# Patient Record
Sex: FEMALE | Race: WHITE | NOT HISPANIC OR LATINO | Employment: OTHER | URBAN - METROPOLITAN AREA
[De-identification: names, ages, dates, MRNs, and addresses within clinical notes are randomized per-mention and may not be internally consistent; named-entity substitution may affect disease eponyms.]

---

## 2024-03-11 ENCOUNTER — TELEPHONE (OUTPATIENT)
Dept: GASTROENTEROLOGY | Facility: CLINIC | Age: 79
End: 2024-03-11
Payer: MEDICARE

## 2024-03-12 NOTE — TELEPHONE ENCOUNTER
The patient had 3 TA. She is due for repeat colonoscopy 9/20/2024. Okay to make next available OV with NP to update chart and have repeat colonoscopy scheduled. Unclear if I had listed 5 years in error but 3 years is the correct repeat colonoscopy timeframe after that colonoscopy.  NBP

## 2024-03-20 RX ORDER — LOVASTATIN 20 MG/1
TABLET ORAL
COMMUNITY
Start: 2024-03-06

## 2024-03-20 RX ORDER — AMLODIPINE BESYLATE 5 MG/1
TABLET ORAL
COMMUNITY
Start: 2024-02-26

## 2024-03-20 RX ORDER — LEVOTHYROXINE SODIUM 100 UG/1
TABLET ORAL
COMMUNITY
Start: 2024-03-04

## 2024-03-20 RX ORDER — CARVEDILOL 12.5 MG/1
TABLET ORAL
COMMUNITY
Start: 2023-12-29

## 2024-03-20 RX ORDER — LOSARTAN POTASSIUM AND HYDROCHLOROTHIAZIDE 12.5; 1 MG/1; MG/1
1 TABLET ORAL
COMMUNITY
Start: 2024-02-28

## 2024-03-20 NOTE — PROGRESS NOTES
Clinic Note    Reason for visit:  The primary encounter diagnosis was Irritable bowel syndrome with diarrhea. Diagnoses of History of colon polyps, Diverticulosis of colon, and Screening for colon cancer were also pertinent to this visit.    PCP: Ananth Santiago       HPI:  This is a 78 y.o. female who was last seen by Dr. Nixon in 9/2021. She has h/o colon polyps and is due for repeat colonoscopy in 9/2021. Denies constipation, abdominal pain, or blood in stool. Reports intermittent diarrhea depending on diet. May have diarrhea a couple times per week. Not more than once daily. She does use artificial sweeteners daily. She has a lot of gas as well. She also reports intermittent urgency. May have BM every 1-3 days. She states urgency seems to occur after eating sugar but not always. Does not notice if urgency happens after days of no BM. Denies reflux or trouble swallowing.   No blood thinner. No h/o stroke.     Colonoscopy 9/20/2021: Moderate diverticulosis of the sigmoid colon. CBx nl, 3 TA, repeat colon in 3y.    EGD/Colonoscopy with Dr. Torres 5/21/2014: Sliding medium HH with Aguilar ulcers. Moderate diverticulosis of the the left side of the colon. GBx erosive gastritis    2010 Celiac neg, 2010 EGD Aguilar ulcers, lg HH, ring. 2008 colon nl. Father EC.    Review of Systems   Constitutional:  Negative for fatigue, fever and unexpected weight change.   HENT:  Negative for mouth sores, postnasal drip, sore throat and trouble swallowing.    Eyes:  Negative for pain, discharge and eye dryness.   Respiratory:  Positive for apnea. Negative for cough, choking, chest tightness, shortness of breath and wheezing.    Cardiovascular:  Negative for chest pain, palpitations and leg swelling.   Gastrointestinal:  Negative for abdominal distention, abdominal pain, anal bleeding, blood in stool, change in bowel habit, constipation, diarrhea, nausea, rectal pain, vomiting, reflux and fecal incontinence.   Genitourinary:   "Positive for bladder incontinence. Negative for dysuria and hematuria.   Musculoskeletal:  Negative for arthralgias, back pain and joint swelling.   Integumentary:  Negative for color change and rash.   Allergic/Immunologic: Negative for environmental allergies and food allergies.   Neurological:  Negative for seizures and headaches.   Hematological:  Negative for adenopathy. Does not bruise/bleed easily.        Past Medical History:   Diagnosis Date    Atrial fibrillation     BMI 40.0-44.9, adult     Disorder of thyroid, unspecified     Diverticulosis     Dyslipidemia     High cholesterol     Hypertension     Liposarcoma     ANN-MARIE (obstructive sleep apnea)     Personal history of colonic polyps      Past Surgical History:   Procedure Laterality Date    CHOLECYSTECTOMY      COLONOSCOPY  09/20/2021    EGD - EXTERNAL RESULT  05/21/2014    EYE SURGERY Left 2023 October     Family History   Problem Relation Age of Onset    Hepatitis Mother     Cancer Father     Colon cancer Neg Hx     Rectal cancer Neg Hx     Liver disease Neg Hx     Stomach cancer Neg Hx     Pancreatic cancer Neg Hx     Crohn's disease Neg Hx     Ulcerative colitis Neg Hx      Social History     Tobacco Use    Smoking status: Never    Smokeless tobacco: Never   Substance Use Topics    Alcohol use: Never    Drug use: Never     Review of patient's allergies indicates:  No Known Allergies   Medication List with Changes/Refills   Current Medications    AMLODIPINE (NORVASC) 5 MG TABLET        CARVEDILOL (COREG) 12.5 MG TABLET        LOSARTAN-HYDROCHLOROTHIAZIDE 100-12.5 MG (HYZAAR) 100-12.5 MG TAB    Take 1 tablet by mouth.    LOVASTATIN (MEVACOR) 20 MG TABLET        SYNTHROID 100 MCG TABLET        VITAMIN D (VITAMIN D3) 1000 UNITS TAB    Take 1,000 Units by mouth once daily.         Vital Signs:  BP (!) 178/89 (BP Location: Left arm, Patient Position: Sitting)   Pulse 65   Ht 5' 6" (1.676 m)   Wt 117 kg (258 lb)   SpO2 96%   BMI 41.64 kg/m²  "       Physical Exam  Vitals reviewed.   Constitutional:       General: She is awake. She is not in acute distress.     Appearance: Normal appearance. She is well-developed. She is obese. She is not ill-appearing, toxic-appearing or diaphoretic.   HENT:      Head: Normocephalic and atraumatic.      Nose: Nose normal.      Mouth/Throat:      Mouth: Mucous membranes are moist.      Pharynx: Oropharynx is clear. No oropharyngeal exudate or posterior oropharyngeal erythema.   Eyes:      General: Lids are normal. Gaze aligned appropriately. No scleral icterus.        Right eye: No discharge.         Left eye: No discharge.      Conjunctiva/sclera: Conjunctivae normal.   Neck:      Trachea: Trachea normal.   Cardiovascular:      Rate and Rhythm: Normal rate and regular rhythm.      Pulses:           Radial pulses are 2+ on the right side and 2+ on the left side.   Pulmonary:      Effort: Pulmonary effort is normal. No respiratory distress.      Breath sounds: No stridor. No wheezing.   Chest:      Chest wall: No tenderness.   Abdominal:      General: Bowel sounds are normal. There is no distension.      Palpations: Abdomen is soft. There is no fluid wave, hepatomegaly or mass.      Tenderness: There is no abdominal tenderness. There is no guarding or rebound.   Musculoskeletal:         General: No tenderness or deformity.      Cervical back: Full passive range of motion without pain and neck supple. No tenderness.      Right lower leg: No edema.      Left lower leg: No edema.   Lymphadenopathy:      Cervical: No cervical adenopathy.   Skin:     General: Skin is warm and dry.      Capillary Refill: Capillary refill takes less than 2 seconds.      Coloration: Skin is not cyanotic, jaundiced or pale.   Neurological:      General: No focal deficit present.      Mental Status: She is alert and oriented to person, place, and time.      Motor: No tremor.   Psychiatric:         Attention and Perception: Attention normal.          Mood and Affect: Mood and affect normal.         Speech: Speech normal.         Behavior: Behavior normal. Behavior is cooperative.            All of the data above and below has been reviewed by myself and any further interpretations will be reflected in the assessment and plan.   The data includes review of external notes, and independent interpretation of lab results, procedures, x-rays, and imaging reports.      Assessment:  Irritable bowel syndrome with diarrhea    History of colon polyps  -     Ambulatory Referral to External Surgery    Diverticulosis of colon    Screening for colon cancer  -     Ambulatory Referral to External Surgery    Colon due 9/2024.  Trial of Xifaxan for IBS-D. Will give samples. 2021 CBx nl.      Recommendations:  Schedule colonoscopy with Dr. Nixon after 9/20/2024.   Begin taking Xifaxan 550 mg three times daily for 5 days.     Risks, benefits, and alternatives of medical management, any associated procedures, and/or treatment discussed with the patient. Patient given opportunity to ask questions and voices understanding. Patient has elected to proceed with the recommended care modalities as discussed.    Follow up if symptoms worsen or fail to improve.    Order summary:  Orders Placed This Encounter   Procedures    Ambulatory Referral to External Surgery        Instructed patient to notify my office if they have not been contacted within two weeks after any procedures, submitting any samples (biopsies, blood, stool, urine, etc.) or after any imaging (X-ray, CT, MRI, etc.).      Smitha Gibbs NP    This document may have been created using a voice recognition transcribing system. Incorrect words or phrases may have been missed during proofreading. Please interpret accordingly or contact me for clarification.

## 2024-03-21 ENCOUNTER — OFFICE VISIT (OUTPATIENT)
Dept: GASTROENTEROLOGY | Facility: CLINIC | Age: 79
End: 2024-03-21
Payer: MEDICARE

## 2024-03-21 VITALS
HEART RATE: 65 BPM | DIASTOLIC BLOOD PRESSURE: 89 MMHG | SYSTOLIC BLOOD PRESSURE: 178 MMHG | HEIGHT: 66 IN | OXYGEN SATURATION: 96 % | WEIGHT: 258 LBS | BODY MASS INDEX: 41.46 KG/M2

## 2024-03-21 DIAGNOSIS — K57.30 DIVERTICULOSIS OF COLON: ICD-10-CM

## 2024-03-21 DIAGNOSIS — Z12.11 SCREENING FOR COLON CANCER: ICD-10-CM

## 2024-03-21 DIAGNOSIS — Z86.010 HISTORY OF COLON POLYPS: ICD-10-CM

## 2024-03-21 DIAGNOSIS — K58.0 IRRITABLE BOWEL SYNDROME WITH DIARRHEA: Primary | ICD-10-CM

## 2024-03-21 PROCEDURE — 99203 OFFICE O/P NEW LOW 30 MIN: CPT | Mod: S$GLB,,,

## 2024-03-21 RX ORDER — CHOLECALCIFEROL (VITAMIN D3) 25 MCG
1000 TABLET ORAL DAILY
COMMUNITY

## 2024-03-21 NOTE — LETTER
March 21, 2024        Ananth Santiago MD  [address]  [address]  [place] [address]             [facility]  [address] [address]  Phone: [phone]  Fax: [phone]   Patient: Lynn Castellano   MR Number: [id number]   YOB: 1945   Date of Visit: 3/21/2024       Dear Dr. Santiago:    Thank you for referring Lynn Castellano to me for evaluation. Attached you will find relevant portions of my assessment and plan of care.    If you have questions, please do not hesitate to call me. I look forward to following Lynn Castellano along with you.    Sincerely,      Smitha Gibbs, NP            CC  No Recipients    Enclosure

## 2024-03-21 NOTE — PATIENT INSTRUCTIONS
Schedule colonoscopy with Dr. Nixon after 9/20/2024.   Begin taking Xifaxan 550 mg three times daily for 5 days.     Please notify my office if you have not been contacted within two weeks after any procedures, submitting any samples (biopsies, blood, stool, urine, etc.) or after any imaging (X-ray, CT, MRI, etc.).

## 2024-09-30 ENCOUNTER — TELEPHONE (OUTPATIENT)
Dept: GASTROENTEROLOGY | Facility: CLINIC | Age: 79
End: 2024-09-30
Payer: MEDICARE

## 2024-09-30 VITALS — HEIGHT: 66 IN | WEIGHT: 258 LBS | BODY MASS INDEX: 41.46 KG/M2

## 2024-09-30 DIAGNOSIS — Z12.11 SCREENING FOR COLON CANCER: ICD-10-CM

## 2024-09-30 DIAGNOSIS — Z86.0100 HISTORY OF COLON POLYPS: Primary | ICD-10-CM

## 2024-09-30 RX ORDER — SOD SULF/POT CHLORIDE/MAG SULF 1.479 G
TABLET ORAL
Qty: 24 TABLET | Refills: 0 | Status: SHIPPED | OUTPATIENT
Start: 2024-09-30

## 2024-09-30 NOTE — TELEPHONE ENCOUNTER
"[facility]  [address] [address]  Phone: [phone]  Fax: [phone]    History & Physical         Provider: Dr. Briana Nixon    Patient Name: Lynn CASSIDY (age):1945  79 y.o.           Gender: female   Phone: [phone]     Referring Physician: Ananth Santiago     Vital Signs:   Height - 5' 6"  Weight - 258 lb  BMI -  41.64    Plan: Colonoscopy @ [facility]    Encounter Diagnoses   Name Primary?    History of colon polyps Yes    Screening for colon cancer            History:      Past Medical History:   Diagnosis Date    Atrial fibrillation     BMI 40.0-44.9, adult     Disorder of thyroid, unspecified     Diverticulosis     Dyslipidemia     High cholesterol     Hypertension     Liposarcoma     ANN-MARIE (obstructive sleep apnea)     Personal history of colonic polyps       Past Surgical History:   Procedure Laterality Date    CHOLECYSTECTOMY      COLONOSCOPY  2021    EGD - EXTERNAL RESULT  2014    EYE SURGERY Left 2023      Medication List with Changes/Refills   Current Medications    AMLODIPINE (NORVASC) 5 MG TABLET        CARVEDILOL (COREG) 12.5 MG TABLET        LOSARTAN-HYDROCHLOROTHIAZIDE 100-12.5 MG (HYZAAR) 100-12.5 MG TAB    Take 1 tablet by mouth.    LOVASTATIN (MEVACOR) 20 MG TABLET        SYNTHROID 100 MCG TABLET        VITAMIN D (VITAMIN D3) 1000 UNITS TAB    Take 1,000 Units by mouth once daily.      Review of patient's allergies indicates:  No Known Allergies   Family History   Problem Relation Name Age of Onset    Hepatitis Mother      Cancer Father      Colon cancer Neg Hx      Rectal cancer Neg Hx      Liver disease Neg Hx      Stomach cancer Neg Hx      Pancreatic cancer Neg Hx      Crohn's disease Neg Hx      Ulcerative colitis Neg Hx        Social History     Tobacco Use    Smoking status: Never    Smokeless tobacco: Never   Substance " Use Topics    Alcohol use: Never    Drug use: Never        Physical Examination:     General Appearance:___________________________  HEENT: _____________________________________  Abdomen:____________________________________  Heart:________________________________________  Lungs:_______________________________________  Extremities:___________________________________  Skin:_________________________________________  Endocrine:____________________________________  Genitourinary:_________________________________  Neurological:__________________________________      Patient has been evaluated immediately prior to sedation and is medically cleared for endoscopy with IVCS as an ASA class: ______      Physician Signature: _________________________       Date: ________  Time: ________

## 2024-09-30 NOTE — TELEPHONE ENCOUNTER
Called and told pt that I was calling as a courtesy regarding up coming Colon with NBP on 10/7/24, Monday and wanted to verify that she has her paper prep instructions and meds. Pt stated she has the instruction, but needs the prep meds sent to her pharmacy. Sent message to NP's.  I also mentioned that [facility] will call the day before (FRI) with the arrival time, GI Lab is located on the third floor, and to pre-register before any time this week. denis

## 2024-10-07 ENCOUNTER — OUTSIDE PLACE OF SERVICE (OUTPATIENT)
Dept: GASTROENTEROLOGY | Facility: CLINIC | Age: 79
End: 2024-10-07

## 2024-10-07 LAB — CRC RECOMMENDATION EXT: NORMAL

## 2024-10-07 PROCEDURE — 45385 COLONOSCOPY W/LESION REMOVAL: CPT | Mod: PT,,, | Performed by: INTERNAL MEDICINE

## 2024-10-15 ENCOUNTER — TELEPHONE (OUTPATIENT)
Dept: GASTROENTEROLOGY | Facility: CLINIC | Age: 79
End: 2024-10-15
Payer: MEDICARE

## 2024-10-15 NOTE — TELEPHONE ENCOUNTER
3 polyps: 2 TA, repeat colonoscopy in 3 years.     Notify patient. Confirm recall tab in appointment desk is up-to-date (update if needed).  NBP

## 2024-11-20 ENCOUNTER — DOCUMENTATION ONLY (OUTPATIENT)
Dept: GASTROENTEROLOGY | Facility: CLINIC | Age: 79
End: 2024-11-20
Payer: MEDICARE

## 2025-03-24 ENCOUNTER — TELEPHONE (OUTPATIENT)
Dept: GASTROENTEROLOGY | Facility: CLINIC | Age: 80
End: 2025-03-24
Payer: MEDICARE

## 2025-03-24 NOTE — TELEPHONE ENCOUNTER
----- Message from Alexa sent at 3/24/2025  9:01 AM CDT -----  Contact: NABIL LORD [[id number]]  ...Type:  Patient Requesting Appointment Who Called: NABIL LORD [[id number]]Symptoms?: pt got letter in mail to schedule apptWhen they would like to be seen?  Would the patient rather a call back or a response via [facility]?  callBest Call Back Number: [phone] (home) Additional Information:

## 2025-03-24 NOTE — TELEPHONE ENCOUNTER
Called patient and let them know they can disregard the letter in the mail about colon procedure. -ABO